# Patient Record
Sex: MALE | Race: ASIAN
[De-identification: names, ages, dates, MRNs, and addresses within clinical notes are randomized per-mention and may not be internally consistent; named-entity substitution may affect disease eponyms.]

---

## 2019-03-18 ENCOUNTER — HOSPITAL ENCOUNTER (EMERGENCY)
Dept: HOSPITAL 80 - FED | Age: 19
Discharge: HOME | End: 2019-03-18
Payer: COMMERCIAL

## 2019-03-18 VITALS — DIASTOLIC BLOOD PRESSURE: 76 MMHG | SYSTOLIC BLOOD PRESSURE: 132 MMHG

## 2019-03-18 DIAGNOSIS — J45.901: ICD-10-CM

## 2019-03-18 DIAGNOSIS — R09.81: Primary | ICD-10-CM

## 2019-03-18 NOTE — EDPHY
H & P


Stated Complaint: Pt has exacerbated asthma, lungs clear/sats 100%.


Time Seen by Provider: 03/18/19 18:31


HPI/ROS: 


HPI:  This is a 18-year-old male who presents with





Chief Complaint: Pt has exacerbated asthma, lungs clear/sats 100%.





Location:  Nose


Quality:  Congestion


Duration:  1 hr prior to arrival


Signs and Symptoms:  no shortness of breath at rest, no shortness of breath on 

exertion, no cough, no chest pain, no palpitations, no lower extremity edema, 

no wheezing, no orthopnea, no paroxysmal nocturnal dyspnea, no fever, no injury/

trauma, no hemoptysis, no carpal pedal spasms


Timing:  Acute, resolved


Severity:  Mild-to-moderate


Context:  Patient is an international student at [employer] 

presents with sudden onset of approximately 1 hr prior to arrival of inability 

to "breathe in through his nose."  Patient reports that he feels nasal 

congestion but denies rhinorrhea, fever, headache, sinus pressure.  Patient has 

a history asthma and felt like he could not take a deep breath and so tried his 

inhaler without any improvement in his symptoms.  Patient reports that 

currently he is able to take a deep breath and is able to breathe normally.  

Denies any fever, cough, chest pain, shortness of breath, wheezing.


Modifying Factors:  Albuterol inhaler with no relief





Comment: 








ROS:  A comprehensive 10 system review of systems is otherwise negative aside 

from elements mentioned in the history of present illness. 





MEDICAL/SURGICAL/SOCIAL HISTORY: 


Medical history:  Asthma, rhinitis.


Surgical history:  Denies


Social history:  Student at [employer].  Denies alcohol and 

tobacco use.











CONSTITUTIONAL:  Anxious but extremely well-appearing  young adult male, 

awake and alert, no obvious distress


HEENT: Atraumatic and normocephalic, PERRL, EOMI.  Nares patent; no rhinorrhea;

  mild nasal mucosal edema.  No sinus tenderness.  Tympanic membranes clear. 

Oropharynx clear, no exudate and moist pink mucosa.  Airway patent.  No 

lymphadenopathy.  No meningismus.


Cardiovascular: Normal S1/S2, regular rate, regular rhythm, without murmur rub 

or gallop.


PULMONARY/CHEST:  Symmetrical and nontender. Clear to auscultation bilaterally. 

Good air movement. No accessory muscle usage.


ABDOMEN:  Soft, nondistended, nontender, no rebound, no guarding, no peritoneal 

signs, no masses or organomegaly. No CVAT.


EXTREMITIES:  2/2 pulses, strength 5/5, no deformities, no clubbing, no 

cyanosis or edema.


NEUROLOGICAL: no focal neuro deficits.  GCS 15.


SKIN: Warm and dry, no erythema. no rash.  Good capillary refill. 


  





Source: Patient


Exam Limitations: No limitations





- Personal History


Current Tetanus/Diphtheria Vaccine: Yes





- Medical/Surgical History


Hx Asthma: Yes


Hx Chronic Respiratory Disease: No


Hx Diabetes: No


Hx Cardiac Disease: No


Hx Renal Disease: No


Hx Cirrhosis: No


Hx Alcoholism: No


Hx HIV/AIDS: No


Hx Splenectomy or Spleen Trauma: No


Other PMH: Asthma, rhinitis





- Social History


Smoking Status: Never smoked


Constitutional: 


 Initial Vital Signs











Temperature (C)  36.8 C   03/18/19 18:24


 


Heart Rate  73   03/18/19 18:24


 


Respiratory Rate  18   03/18/19 18:24


 


Blood Pressure  132/76 H  03/18/19 18:24


 


O2 Sat (%)  99   03/18/19 18:24








 











O2 Delivery Mode               Room Air














Allergies/Adverse Reactions: 


 





No Known Allergies Allergy (Unverified 03/18/19 18:24)


 








Home Medications: 














 Medication  Instructions  Recorded


 


Fluticasone Nasal [Flonase Nasal 1 sprays NASAL DAILY #1 mdi 03/18/19





Spray (RX)]  


 


Pseudoephedrine HCl [Sudafed] 30 mg PO Q6 #12 tab 03/18/19














Medical Decision Making


ED Course/Re-evaluation: 


Vital signs reviewed and stable upon arrival.


Lung exam benign.


Suspect nasal congestion secondary to allergy versus viral.


No indication for antibiotic.


Will start patient on Flonase and Sudafed.


No signs of respiratory distress/asthma exacerbation.


Wells criteria is low for pulmonary embolism.








This patient was seen under the supervision of my secondary supervising 

physician.  I evaluated care for this patient with attending. 


Differential Diagnosis: 


Differential diagnosis includes but is not limited to asthma exacerbation, 

upper respiratory infection, eustachian tube dysfunction, rhinitis, sinusitis, 

anxiety.








Departure





- Departure


Disposition: Home, Routine, Self-Care


Clinical Impression: 


 Nasal congestion, Asthma without acute exacerbation





Condition: Good


Instructions:  Cold Symptoms (ED)


Additional Instructions: 


Use your albuterol inhaler every 4-6 hours as needed for shortness of breath, 

wheezing.


Take Tylenol 650 mg every 4 hours and/or Ibuprofen 600 mg every 8 hours with 

food as needed for pain. 


Take over-the-counter Flonase or Rhinocort daily as needed for nasal congestion.


Use over-the-counter Sudafed every 6 hr as needed for nasal congestion.


Referrals: 


[facility] [Clinic] - 3-4 days, if not improved


Prescriptions: 


Fluticasone Nasal [Flonase Nasal Spray (RX)] 1 sprays NASAL DAILY #1 mdi


Pseudoephedrine HCl [Sudafed] 30 mg PO Q6 #12 tab

## 2019-04-10 ENCOUNTER — HOSPITAL ENCOUNTER (OUTPATIENT)
Dept: HOSPITAL 80 - FIMAGING | Age: 19
End: 2019-04-10
Attending: INTERNAL MEDICINE
Payer: COMMERCIAL

## 2019-04-10 DIAGNOSIS — J45.909: ICD-10-CM

## 2019-04-10 DIAGNOSIS — R06.09: Primary | ICD-10-CM

## 2019-04-10 DIAGNOSIS — G47.19: ICD-10-CM
